# Patient Record
Sex: MALE | Race: AMERICAN INDIAN OR ALASKA NATIVE
[De-identification: names, ages, dates, MRNs, and addresses within clinical notes are randomized per-mention and may not be internally consistent; named-entity substitution may affect disease eponyms.]

---

## 2017-06-22 ENCOUNTER — HOSPITAL ENCOUNTER (EMERGENCY)
Dept: HOSPITAL 14 - H.ER | Age: 37
LOS: 1 days | Discharge: HOME | End: 2017-06-23
Payer: COMMERCIAL

## 2017-06-22 VITALS — BODY MASS INDEX: 27 KG/M2

## 2017-06-22 DIAGNOSIS — S40.012A: Primary | ICD-10-CM
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
## 2017-06-23 VITALS
TEMPERATURE: 98.6 F | HEART RATE: 79 BPM | DIASTOLIC BLOOD PRESSURE: 90 MMHG | RESPIRATION RATE: 18 BRPM | SYSTOLIC BLOOD PRESSURE: 136 MMHG | OXYGEN SATURATION: 99 %
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
## 2017-11-19 ENCOUNTER — HOSPITAL ENCOUNTER (EMERGENCY)
Dept: HOSPITAL 14 - H.ER | Age: 37
Discharge: HOME | End: 2017-11-19
Payer: COMMERCIAL

## 2017-11-19 VITALS
TEMPERATURE: 98 F | SYSTOLIC BLOOD PRESSURE: 132 MMHG | HEART RATE: 89 BPM | DIASTOLIC BLOOD PRESSURE: 93 MMHG | OXYGEN SATURATION: 98 %

## 2017-11-19 VITALS — BODY MASS INDEX: 27.7 KG/M2

## 2017-11-19 DIAGNOSIS — L72.3: Primary | ICD-10-CM

## 2017-11-19 NOTE — ED PDOC
HPI: Skin/Bite Injury


Time Seen by Provider: 11/19/17 09:08


Chief Complaint (Nursing): Abnormal Skin Integrity


Chief Complaint (Provider): cysts on face


History Per: Patient


History/Exam Limitations: no limitations


Onset/Duration Of Symptoms: Gradual, Other (x months)


Location Of Injury: Left: Face


Quality Of Symptoms: Other ("tingly")


Additional Complaint(s): 





38yo male c/o cysts to left side of face ongoing for several months. Denies 

fever, drainage, inability to open mouth, pain w chewing or headache. States 

had cyst on buttock before. States went to [facility] had "an MRI 

performed" but unknown specific results. Hasnt seen surgeon/plastics yet. 





Past Medical History


Reviewed: Historical Data, Nursing Documentation, Vital Signs


Vital Signs: 





 Last Vital Signs











Temp  98 F   11/19/17 08:45


 


Pulse  89   11/19/17 08:45


 


Resp      


 


BP  132/93 H  11/19/17 08:45


 


Pulse Ox  98   11/19/17 08:45














- Medical History


PMH: GERD





- Family History


Family History: States: Unknown Family Hx





- Social History


Current smoker - smoking cessation education provided: Yes


Drugs: Denies





- Immunization History


Hx Tetanus Toxoid Vaccination: No


Hx Influenza Vaccination: No


Hx Pneumococcal Vaccination: No





- Home Medications


Home Medications: 


 Ambulatory Orders











 Medication  Instructions  Recorded


 


Clindamycin [Cleocin] 150 mg PO TID #21 cap 11/19/17














- Allergies


Allergies/Adverse Reactions: 


 Allergies











Allergy/AdvReac Type Severity Reaction Status Date / Time


 


No Known Allergies Allergy   Verified 11/19/17 08:50














Review of Systems


ROS Statement: Except As Marked, All Systems Reviewed And Found Negative


Constitutional: Negative for: Fever, Sweats


Eyes: Negative for: Vision Change


Cardiovascular: Negative for: Palpitations


Respiratory: Negative for: Cough


Gastrointestinal: Negative for: Abdominal Pain


Genitourinary Male: Negative for: Dysuria


Skin: Positive for: Lesions


Neurological: Negative for: Weakness, Numbness, Dizziness





Physical Exam





- Reviewed


Nursing Documentation Reviewed: Yes


Vital Signs Reviewed: Yes





- Physical Exam


Appears: Positive for: Well, Non-toxic


Head Exam: Positive for: ATRAUMATIC


Skin: Positive for: Normal Color, Warm, Dry


ENT: Positive for: Other (2 nontender cyst like structures to left lower face/ 

jaw line, no fluctuance, no erythema, no drainage)





- ECG


O2 Sat by Pulse Oximetry: 98





Medical Decision Making


Medical Decision Making: 





cysts require likely surgical intervention out of scope of ED given proximity 

to facial structures, parotid gland, and chronicity now ongoing for months.


Rx clinda and given referral to surgery, [place]. 





Disposition





- Clinical Impression


Clinical Impression: 


 Cyst of face








- Patient ED Disposition


Is Patient to be Admitted: No


Counseled Patient/Family Regarding: Studies Performed, Diagnosis, Need For 

Followup, Rx Given





- Disposition


Referrals: 


Josesito Munoz MD [Staff Provider] - 


[facility] [Provider Group]


[facility] [Provider Group]


[facility] [Provider Group]


Disposition: Routine/Home


Disposition Time: 09:05


Condition: STABLE


Additional Instructions: 


See surgeon for scheduling of removal of multiple cysts to face. Take 

medication as directed. Return to ER for any worse or new symptoms.





Prescriptions: 


Clindamycin [Cleocin] 150 mg PO TID #21 cap


Instructions:  Cyst (ED)

## 2019-04-15 ENCOUNTER — HOSPITAL ENCOUNTER (EMERGENCY)
Dept: HOSPITAL 31 - C.ER | Age: 39
Discharge: HOME | End: 2019-04-15
Payer: COMMERCIAL

## 2019-04-15 VITALS — DIASTOLIC BLOOD PRESSURE: 78 MMHG | RESPIRATION RATE: 16 BRPM | HEART RATE: 80 BPM | SYSTOLIC BLOOD PRESSURE: 146 MMHG

## 2019-04-15 VITALS — BODY MASS INDEX: 31.6 KG/M2

## 2019-04-15 VITALS — OXYGEN SATURATION: 99 %

## 2019-04-15 VITALS — TEMPERATURE: 98.2 F

## 2019-04-15 DIAGNOSIS — R07.89: Primary | ICD-10-CM

## 2019-04-15 NOTE — RAD
Date of service: 



04/15/2019



HISTORY:

 chest pain 



COMPARISON:

No prior.



TECHNIQUE:

Chest PA and lateral views



FINDINGS:



LUNGS:

No active pulmonary disease.



PLEURA:

No significant pleural effusion identified. No pneumothorax apparent.



CARDIOVASCULAR:

No aortic atherosclerotic calcification present.



Normal cardiac size. No pulmonary vascular congestion. 



OSSEOUS STRUCTURES:

No significant abnormalities.



VISUALIZED UPPER ABDOMEN:

Normal.



OTHER FINDINGS:

None.



IMPRESSION:

No active disease.

## 2019-04-15 NOTE — C.PDOC
History Of Present Illness


38 year old male presents to ED with complaint of chest pain. Patient admits to 

drinking. Patient is not currently experiencing chest pain. Patient denies 

fever, trauma, SOB, nausea, vomiting, and cough. 





Time Seen by Provider: 04/15/19 16:55


Chief Complaint (Nursing): Chest Pain


History Per: Patient


History/Exam Limitations: no limitations


Onset/Duration Of Symptoms: Unknown


Current Symptoms Are (Timing): Gone


Quality: "Pain"


Associated Symptoms: denies: Nausea, Dyspnea


Modifying Factors: None


Exacerbating Factors: None


Alleviating Factors: None





Past Medical History


Reviewed: Historical Data, Nursing Documentation, Vital Signs


Vital Signs: 





                                Last Vital Signs











Temp  98.2 F   04/15/19 16:25


 


Pulse  83   04/15/19 16:25


 


Resp  18   04/15/19 16:25


 


BP  132/97 H  04/15/19 16:25


 


Pulse Ox  99   04/15/19 16:25














- Medical History


PMH: No Chronic Diseases, GERD


Surgical History: No Surg Hx


Family History: States: Unknown Family Hx





- Social History


Hx Tobacco Use: No


Hx Alcohol Use: Yes


Hx Substance Use: No





- Immunization History


Hx Tetanus Toxoid Vaccination: No


Hx Influenza Vaccination: Yes (2019)


Hx Pneumococcal Vaccination: Yes





Review Of Systems


Constitutional: Negative for: Fever


Cardiovascular: Positive for: Chest Pain


Respiratory: Negative for: Cough, Shortness of Breath


Gastrointestinal: Negative for: Nausea, Vomiting





Physical Exam





- Physical Exam


Appears: Non-toxic, No Acute Distress


Skin: Normal Color, Warm, Dry


Head: Atraumatic, Normacephalic


Neck: Normal ROM, Supple


Chest: Symmetrical, No Deformity, No Tenderness


Cardiovascular: Rhythm Regular, No Murmur


Respiratory: No Accessory Muscle Use, No Rales, No Rhonchi, No Wheezing


Gastrointestinal/Abdominal: Soft, No Tenderness


Extremity: Capillary Refill (<2 seconds)


Neurological/Psych: Oriented x3, Normal Speech, Normal Cognition





ED Course And Treatment


ECG: Interpreted By Me


ECG Rhythm: Sinus Rhythm


ECG Interpretation: Normal


Interpretation Of ECG: normal axis, normal ST-T waves


Rate From EC


O2 Sat by Pulse Oximetry: 99 (on RA)


Pulse Ox Interpretation: Normal





- Radiology


CXR: Interpreted by Me


CXR Interpretation: Yes: No Acute Disease.  No: Infiltrates





Medical Decision Making


Medical Decision Making: 





Plan:


EKG


CXR





On re-exam, the patient reports improvement of symptoms. Lungs are CTA, heart is

RRR, abdomen is soft, non-tender and tolerating PO well. Pt is ambulatory in the

ED with steady gait. Follow up with the medical doctor within 1-2 days. Return 

if worsened. 





Disposition





- Disposition


Referrals: 


[facility] [Outside]


[facility] [Outside]


Disposition: HOME/ ROUTINE


Disposition Time: 18:17


Condition: GOOD


Additional Instructions: 


 Follow up with the medical doctor within 1-2 days. Return if worsened. 


Prescriptions: 


Ibuprofen [Motrin] 600 mg PO TID #21 tab


Instructions:  Pleuritic Chest Pain


Forms:  [facility] (English)





- Clinical Impression


Clinical Impression: 


 Chest discomfort








- PA / NP / Resident Statement


MD/DO has reviewed & agrees with the documentation as recorded. (Tere Regan)





- Scribe Statement


The provider has reviewed the documentation as recorded by the Scribe (Tere Regan)








All medical record entries made by the Scribe were at my direction and persona

lly dictated by me. I have reviewed the chart and agree that the record 

accurately reflects my personal performance of the history, physical exam, 

medical decision making, and the department course for this patient. I have also

 personally directed, reviewed, and agree with the discharge instructions and 

disposition.

## 2019-04-16 NOTE — CARD
--------------- APPROVED REPORT --------------





Date of service: 04/15/2019



EKG Measurement

Heart [id number]

[id number]

[id number]

[id number]

[id number]



<Conclusion>

Normal sinus rhythm

Normal ECG